# Patient Record
Sex: MALE | Race: WHITE | Employment: UNEMPLOYED | URBAN - METROPOLITAN AREA
[De-identification: names, ages, dates, MRNs, and addresses within clinical notes are randomized per-mention and may not be internally consistent; named-entity substitution may affect disease eponyms.]

---

## 2020-09-15 ENCOUNTER — HOSPITAL ENCOUNTER (EMERGENCY)
Facility: CLINIC | Age: 40
Discharge: HOME | End: 2020-09-16
Attending: EMERGENCY MEDICINE | Admitting: EMERGENCY MEDICINE
Payer: COMMERCIAL

## 2020-09-15 DIAGNOSIS — R00.0 TACHYCARDIA: ICD-10-CM

## 2020-09-15 DIAGNOSIS — F10.930 ALCOHOL WITHDRAWAL SYNDROME WITHOUT COMPLICATION (H): ICD-10-CM

## 2020-09-15 DIAGNOSIS — R11.2 NAUSEA AND VOMITING IN ADULT: ICD-10-CM

## 2020-09-15 PROCEDURE — 83735 ASSAY OF MAGNESIUM: CPT | Performed by: EMERGENCY MEDICINE

## 2020-09-15 PROCEDURE — 93005 ELECTROCARDIOGRAM TRACING: CPT | Performed by: EMERGENCY MEDICINE

## 2020-09-15 PROCEDURE — 85025 COMPLETE CBC W/AUTO DIFF WBC: CPT | Performed by: EMERGENCY MEDICINE

## 2020-09-15 PROCEDURE — 80053 COMPREHEN METABOLIC PANEL: CPT | Performed by: EMERGENCY MEDICINE

## 2020-09-15 PROCEDURE — 84443 ASSAY THYROID STIM HORMONE: CPT | Performed by: EMERGENCY MEDICINE

## 2020-09-15 PROCEDURE — 84484 ASSAY OF TROPONIN QUANT: CPT | Performed by: EMERGENCY MEDICINE

## 2020-09-15 PROCEDURE — 99284 EMERGENCY DEPT VISIT MOD MDM: CPT | Performed by: EMERGENCY MEDICINE

## 2020-09-15 PROCEDURE — 93010 ELECTROCARDIOGRAM REPORT: CPT | Mod: Z6 | Performed by: EMERGENCY MEDICINE

## 2020-09-15 PROCEDURE — 85379 FIBRIN DEGRADATION QUANT: CPT | Performed by: EMERGENCY MEDICINE

## 2020-09-15 PROCEDURE — 99285 EMERGENCY DEPT VISIT HI MDM: CPT | Mod: 25 | Performed by: EMERGENCY MEDICINE

## 2020-09-15 RX ORDER — SODIUM CHLORIDE 9 MG/ML
INJECTION, SOLUTION INTRAVENOUS CONTINUOUS
Status: DISCONTINUED | OUTPATIENT
Start: 2020-09-16 | End: 2020-09-16 | Stop reason: HOSPADM

## 2020-09-15 RX ORDER — LANOLIN ALCOHOL/MO/W.PET/CERES
100 CREAM (GRAM) TOPICAL DAILY
Status: DISCONTINUED | OUTPATIENT
Start: 2020-09-16 | End: 2020-09-16 | Stop reason: HOSPADM

## 2020-09-15 RX ORDER — FOLIC ACID 1 MG/1
1 TABLET ORAL DAILY
Status: DISCONTINUED | OUTPATIENT
Start: 2020-09-16 | End: 2020-09-16 | Stop reason: HOSPADM

## 2020-09-15 RX ORDER — DIAZEPAM 5 MG
10 TABLET ORAL EVERY 30 MIN PRN
Status: DISCONTINUED | OUTPATIENT
Start: 2020-09-15 | End: 2020-09-16 | Stop reason: HOSPADM

## 2020-09-15 RX ORDER — MULTIPLE VITAMINS W/ MINERALS TAB 9MG-400MCG
1 TAB ORAL DAILY
Status: DISCONTINUED | OUTPATIENT
Start: 2020-09-16 | End: 2020-09-16 | Stop reason: HOSPADM

## 2020-09-15 ASSESSMENT — MIFFLIN-ST. JEOR: SCORE: 1694.17

## 2020-09-15 NOTE — ED AVS SNAPSHOT
[facility], [place], Emergency Department  [address] [place] [id number]  Phone:  [phone]                                    José Luis Melvin   MRN: [id number]    Department:  [facility], Emergency Department   Date of Visit:  9/15/2020           After Visit Summary Signature Page    I have received my discharge instructions, and my questions have been answered. I have discussed any challenges I see with this plan with the nurse or doctor.    ..........................................................................................................................................  Patient/Patient Representative Signature      ..........................................................................................................................................  Patient Representative Print Name and Relationship to Patient    ..................................................               ................................................  Date                                   Time    ..........................................................................................................................................  Reviewed by Signature/Title    ...................................................              ..............................................  Date                                               Time          22EPIC Rev 08/18

## 2020-09-16 VITALS
HEART RATE: 75 BPM | TEMPERATURE: 97.8 F | WEIGHT: 168 LBS | DIASTOLIC BLOOD PRESSURE: 84 MMHG | HEIGHT: 71 IN | RESPIRATION RATE: 16 BRPM | BODY MASS INDEX: 23.52 KG/M2 | SYSTOLIC BLOOD PRESSURE: 129 MMHG | OXYGEN SATURATION: 98 %

## 2020-09-16 LAB
ALBUMIN SERPL-MCNC: 4.8 G/DL (ref 3.4–5)
ALP SERPL-CCNC: 56 U/L (ref 40–150)
ALT SERPL W P-5'-P-CCNC: 29 U/L (ref 0–70)
AMPHETAMINES UR QL SCN: NEGATIVE
ANION GAP SERPL CALCULATED.3IONS-SCNC: 13 MMOL/L (ref 3–14)
AST SERPL W P-5'-P-CCNC: 28 U/L (ref 0–45)
BARBITURATES UR QL: NEGATIVE
BASOPHILS # BLD AUTO: 0 10E9/L (ref 0–0.2)
BASOPHILS NFR BLD AUTO: 0.4 %
BENZODIAZ UR QL: NEGATIVE
BILIRUB SERPL-MCNC: 0.9 MG/DL (ref 0.2–1.3)
BUN SERPL-MCNC: 4 MG/DL (ref 7–30)
CALCIUM SERPL-MCNC: 9.6 MG/DL (ref 8.5–10.1)
CANNABINOIDS UR QL SCN: NEGATIVE
CHLORIDE SERPL-SCNC: 100 MMOL/L (ref 94–109)
CO2 SERPL-SCNC: 22 MMOL/L (ref 20–32)
COCAINE UR QL: NEGATIVE
CREAT SERPL-MCNC: 0.82 MG/DL (ref 0.66–1.25)
D DIMER PPP FEU-MCNC: 0.5 UG/ML FEU (ref 0–0.5)
DIFFERENTIAL METHOD BLD: ABNORMAL
EOSINOPHIL # BLD AUTO: 0 10E9/L (ref 0–0.7)
EOSINOPHIL NFR BLD AUTO: 0 %
ERYTHROCYTE [DISTWIDTH] IN BLOOD BY AUTOMATED COUNT: 14.3 % (ref 10–15)
ETHANOL SERPL-MCNC: <0.01 G/DL
ETHANOL UR QL SCN: NEGATIVE
GFR SERPL CREATININE-BSD FRML MDRD: >90 ML/MIN/{1.73_M2}
GLUCOSE SERPL-MCNC: 133 MG/DL (ref 70–99)
HCT VFR BLD AUTO: 38.1 % (ref 40–53)
HGB BLD-MCNC: 13.1 G/DL (ref 13.3–17.7)
IMM GRANULOCYTES # BLD: 0 10E9/L (ref 0–0.4)
IMM GRANULOCYTES NFR BLD: 0.3 %
LACTATE BLD-SCNC: 0.9 MMOL/L (ref 0.7–2)
LYMPHOCYTES # BLD AUTO: 1.4 10E9/L (ref 0.8–5.3)
LYMPHOCYTES NFR BLD AUTO: 15 %
MAGNESIUM SERPL-MCNC: 1.9 MG/DL (ref 1.6–2.3)
MCH RBC QN AUTO: 32.2 PG (ref 26.5–33)
MCHC RBC AUTO-ENTMCNC: 34.4 G/DL (ref 31.5–36.5)
MCV RBC AUTO: 94 FL (ref 78–100)
MONOCYTES # BLD AUTO: 0.7 10E9/L (ref 0–1.3)
MONOCYTES NFR BLD AUTO: 7.3 %
NEUTROPHILS # BLD AUTO: 7 10E9/L (ref 1.6–8.3)
NEUTROPHILS NFR BLD AUTO: 77 %
NRBC # BLD AUTO: 0 10*3/UL
NRBC BLD AUTO-RTO: 0 /100
OPIATES UR QL SCN: NEGATIVE
PLATELET # BLD AUTO: 332 10E9/L (ref 150–450)
POTASSIUM SERPL-SCNC: 3.3 MMOL/L (ref 3.4–5.3)
PROT SERPL-MCNC: 8.6 G/DL (ref 6.8–8.8)
RBC # BLD AUTO: 4.07 10E12/L (ref 4.4–5.9)
SODIUM SERPL-SCNC: 135 MMOL/L (ref 133–144)
TROPONIN I SERPL-MCNC: <0.015 UG/L (ref 0–0.04)
TSH SERPL DL<=0.005 MIU/L-ACNC: 2.62 MU/L (ref 0.4–4)
WBC # BLD AUTO: 9.1 10E9/L (ref 4–11)

## 2020-09-16 PROCEDURE — 25800030 ZZH RX IP 258 OP 636: Performed by: EMERGENCY MEDICINE

## 2020-09-16 PROCEDURE — 80320 DRUG SCREEN QUANTALCOHOLS: CPT | Performed by: EMERGENCY MEDICINE

## 2020-09-16 PROCEDURE — 83605 ASSAY OF LACTIC ACID: CPT | Performed by: EMERGENCY MEDICINE

## 2020-09-16 PROCEDURE — 80307 DRUG TEST PRSMV CHEM ANLYZR: CPT | Performed by: EMERGENCY MEDICINE

## 2020-09-16 PROCEDURE — 96374 THER/PROPH/DIAG INJ IV PUSH: CPT | Performed by: EMERGENCY MEDICINE

## 2020-09-16 PROCEDURE — 80320 DRUG SCREEN QUANTALCOHOLS: CPT | Mod: 59 | Performed by: EMERGENCY MEDICINE

## 2020-09-16 PROCEDURE — 96361 HYDRATE IV INFUSION ADD-ON: CPT | Performed by: EMERGENCY MEDICINE

## 2020-09-16 PROCEDURE — 25000128 H RX IP 250 OP 636: Performed by: EMERGENCY MEDICINE

## 2020-09-16 PROCEDURE — 25000132 ZZH RX MED GY IP 250 OP 250 PS 637: Performed by: EMERGENCY MEDICINE

## 2020-09-16 RX ORDER — ONDANSETRON 2 MG/ML
4 INJECTION INTRAMUSCULAR; INTRAVENOUS EVERY 30 MIN PRN
Status: DISCONTINUED | OUTPATIENT
Start: 2020-09-16 | End: 2020-09-16 | Stop reason: HOSPADM

## 2020-09-16 RX ORDER — ONDANSETRON 4 MG/1
4 TABLET, ORALLY DISINTEGRATING ORAL EVERY 8 HOURS PRN
Qty: 9 TABLET | Refills: 0 | Status: SHIPPED | OUTPATIENT
Start: 2020-09-16 | End: 2020-09-19

## 2020-09-16 RX ORDER — ONDANSETRON 2 MG/ML
4 INJECTION INTRAMUSCULAR; INTRAVENOUS ONCE
Status: COMPLETED | OUTPATIENT
Start: 2020-09-16 | End: 2020-09-16

## 2020-09-16 RX ADMIN — ONDANSETRON 4 MG: 2 INJECTION INTRAMUSCULAR; INTRAVENOUS at 00:33

## 2020-09-16 RX ADMIN — DIAZEPAM 10 MG: 5 TABLET ORAL at 00:38

## 2020-09-16 RX ADMIN — ONDANSETRON 4 MG: 2 INJECTION INTRAMUSCULAR; INTRAVENOUS at 00:06

## 2020-09-16 RX ADMIN — SODIUM CHLORIDE: 9 INJECTION, SOLUTION INTRAVENOUS at 01:28

## 2020-09-16 RX ADMIN — SODIUM CHLORIDE 1000 ML: 9 INJECTION, SOLUTION INTRAVENOUS at 00:27

## 2020-09-16 RX ADMIN — DIAZEPAM 10 MG: 5 TABLET ORAL at 02:52

## 2020-09-16 ASSESSMENT — ENCOUNTER SYMPTOMS
RHINORRHEA: 0
VOMITING: 1
CONFUSION: 0
BACK PAIN: 0
DIAPHORESIS: 1
TREMORS: 1
SHORTNESS OF BREATH: 1
PALPITATIONS: 1
DYSURIA: 0
NAUSEA: 1
COUGH: 0
CONSTIPATION: 0
DIZZINESS: 1
HEMATURIA: 0
BRUISES/BLEEDS EASILY: 0
ABDOMINAL PAIN: 0
FEVER: 0
DIARRHEA: 0
CHILLS: 1

## 2020-09-16 NOTE — ED PROVIDER NOTES
[place] EMERGENCY DEPARTMENT ([facility])  September 15, 2020  History     Chief Complaint   Patient presents with     Shortness of Breath     Nausea & Vomiting     HPI  José Luis Melvin is a (no history found) 39 year old male who presents the ED today complaining of shortness of breath, nausea, and vomiting.  Patient reports he was driving here from [place] to move his daughter into the dorms when he started getting dizzy.  Patient reports he pulled over and vomited.  Patient reports he drank 2 five hour energies and 2 coffees today, and he did not eat much.  Patient report he normally drinks about a cup of coffee a day and usually eats normally.  Patient reports they stopped at a hotel, where he tried to rehydrate but could not keep down any fluids.  Patient reports he then began experiencing heart palpitations.  Patient reports his nausea and vomiting started at 8 PM tonight.  Patient also reports he felt like he could not breathe.  Patient reports he still feels slightly short of breath here in the ED. Patient reports he has had panic attacks in the past and it felt like that but more severe.  Patient also adds that he goes between feeling cold and hot. Patient reports he started shaking after throwing up in the hotel.  Patient reports he normally drinks 6-10 beers a day with his last drink being this morning of 4 beers. Patient reports he recently had a procedure on his right ankle, in which he stopped drinking a week before the surgery, and for a week after.  Patient reports he experiences no alcohol withdrawal symptoms during this time.  Patient reports he sometimes uses cocaine, but none in the past 48 hours.  Patient reports he sometimes eats 5 mg marijuana edibles, but not today.  Patient denies prior DTs or withdrawal seizures.  Patient denies any abdominal pain, diarrhea, constipation, chest pain, recent cough or cold, urinary symptoms, history of heart issues, or any tobacco  "use.    I have reviewed the Medications, Allergies, Past Medical and Surgical History, and Social History in the [employer] system.  PAST MEDICAL HISTORY:   Past Medical History:   Diagnosis Date     Hypertension        PAST SURGICAL HISTORY:   Past Surgical History:   Procedure Laterality Date     ORTHOPEDIC SURGERY      ACL       Past medical history, past surgical history, medications, and allergies were reviewed with the patient. Additional pertinent items: None    FAMILY HISTORY: No family history on file.    SOCIAL HISTORY:   Social History     Tobacco Use     Smoking status: Passive Smoke Exposure - Never Smoker   Substance Use Topics     Alcohol use: Yes     Alcohol/week: 4.0 standard drinks     Types: 4 Cans of beer per week     Comment: daily 6-8 beers     Social history was reviewed with the patient. Additional pertinent items: None      Patient's Medications    No medications on file        No Known Allergies     Review of Systems   Constitutional: Positive for chills and diaphoresis. Negative for fever.   HENT: Negative for rhinorrhea.    Eyes: Negative for visual disturbance.   Respiratory: Positive for shortness of breath. Negative for cough.    Cardiovascular: Positive for palpitations. Negative for chest pain.   Gastrointestinal: Positive for nausea and vomiting. Negative for abdominal pain, constipation and diarrhea.   Genitourinary: Negative for dysuria and hematuria.   Musculoskeletal: Negative for back pain.   Skin: Negative for rash.   Allergic/Immunologic: Negative for immunocompromised state.   Neurological: Positive for dizziness and tremors (Generalized).   Hematological: Does not bruise/bleed easily.   Psychiatric/Behavioral: Negative for confusion.   All other systems reviewed and are negative.    Physical Exam   BP: (!) 129/104  Pulse: 128  Temp: 97.8  F (36.6  C)  Resp: 28  Height: 180.3 cm (5' 11\")  Weight: 76.2 kg (168 lb)  SpO2: 99 %  Physical Exam  General: Afebrile, ill appearing, " tachycardic, tremulous, nausea, and vomiting  HEENT: Normocephalic, atraumatic, conjunctivae normal. MMM  Neck: non-tender, supple  Cardio: tachycardic rate  Resp: Normal work of breathing, no respiratory distress, lungs clear bilaterally   Chest/Back: no visual signs of trauma  Abdomen: soft, no tenderness, no distension, no peritoneal signs   Neuro: alert and fully oriented. CN II-XII grossly intact. Grossly normal strength and sensation in all extremities. No focal deficits, tremulousness in extremities   MSK: no deformities. Normal range of motion  Integumentary/Skin: no rash visualized, normal color  Psych: normal affect, normal behavior    ED Course   12:08 AM  The patient was seen and examined by Hillary Warren MD in Room ED14.      Procedures          EKG Interpretation:      Interpreted by Hillary Warren MD  Time reviewed: 2348  Symptoms at time of EKG: tachycardic, shortness of breath   Rhythm: sinus tachycardia  Rate: Tachycardia  Axis: Normal  Ectopy: none  Conduction: normal  ST Segments/ T Waves: Non-specific ST-T wave changes, no acute ischemic change  Q Waves: none  Comparison to prior: No old EKG available    Clinical Impression: sinus tachycardia     CRITICAL CARE: 30 minutes exclusive of procedures but including obtaining history, bedside examination, supervision of care, record review and collateral history from other parties, documentation, review/interpretation of imaging and lab results, discussion with patient, family, nursing, ancillary staff, consultants, and admitting service provider.   This patient presented with tachycardic, in alcohol withdrawal requiring immediate bedside evaluation and intervention to prevent sudden, clinically significant, or life threatening deterioration in the patient's condition.    Results for orders placed or performed during the hospital encounter of 09/15/20 (from the past 24 hour(s))   CBC with platelets differential   Result Value Ref Range    WBC  9.1 4.0 - 11.0 10e9/L    RBC Count 4.07 (L) 4.4 - 5.9 10e12/L    Hemoglobin 13.1 (L) 13.3 - 17.7 g/dL    Hematocrit 38.1 (L) 40.0 - 53.0 %    MCV 94 78 - 100 fl    MCH 32.2 26.5 - 33.0 pg    MCHC 34.4 31.5 - 36.5 g/dL    RDW 14.3 10.0 - 15.0 %    Platelet Count 332 150 - 450 10e9/L    Diff Method Automated Method     % Neutrophils 77.0 %    % Lymphocytes 15.0 %    % Monocytes 7.3 %    % Eosinophils 0.0 %    % Basophils 0.4 %    % Immature Granulocytes 0.3 %    Nucleated RBCs 0 0 /100    Absolute Neutrophil 7.0 1.6 - 8.3 10e9/L    Absolute Lymphocytes 1.4 0.8 - 5.3 10e9/L    Absolute Monocytes 0.7 0.0 - 1.3 10e9/L    Absolute Eosinophils 0.0 0.0 - 0.7 10e9/L    Absolute Basophils 0.0 0.0 - 0.2 10e9/L    Abs Immature Granulocytes 0.0 0 - 0.4 10e9/L    Absolute Nucleated RBC 0.0    Comprehensive metabolic panel   Result Value Ref Range    Sodium 135 133 - 144 mmol/L    Potassium 3.3 (L) 3.4 - 5.3 mmol/L    Chloride 100 94 - 109 mmol/L    Carbon Dioxide 22 20 - 32 mmol/L    Anion Gap 13 3 - 14 mmol/L    Glucose 133 (H) 70 - 99 mg/dL    Urea Nitrogen 4 (L) 7 - 30 mg/dL    Creatinine 0.82 0.66 - 1.25 mg/dL    GFR Estimate >90 >60 mL/min/[1.73_m2]    GFR Estimate If Black >90 >60 mL/min/[1.73_m2]    Calcium 9.6 8.5 - 10.1 mg/dL    Bilirubin Total 0.9 0.2 - 1.3 mg/dL    Albumin 4.8 3.4 - 5.0 g/dL    Protein Total 8.6 6.8 - 8.8 g/dL    Alkaline Phosphatase 56 40 - 150 U/L    ALT 29 0 - 70 U/L    AST 28 0 - 45 U/L   Troponin I   Result Value Ref Range    Troponin I ES <0.015 0.000 - 0.045 ug/L   TSH with free T4 reflex   Result Value Ref Range    TSH 2.62 0.40 - 4.00 mU/L   Magnesium   Result Value Ref Range    Magnesium 1.9 1.6 - 2.3 mg/dL   D dimer quantitative   Result Value Ref Range    D Dimer 0.5 0.0 - 0.50 ug/ml FEU     Medications   0.9% sodium chloride BOLUS (0 mLs Intravenous Stopped 9/16/20 0127)     Followed by   sodium chloride 0.9% infusion ( Intravenous New Bag 9/16/20 0128)   diazepam (VALIUM) tablet 10 mg (10  mg Oral Given 9/16/20 0038)   thiamine (B-1) tablet 100 mg (has no administration in time range)   folic acid (FOLVITE) tablet 1 mg (has no administration in time range)   multivitamin w/minerals (THERA-VIT-M) tablet 1 tablet (has no administration in time range)   ondansetron (ZOFRAN) injection 4 mg (4 mg Intravenous Given 9/16/20 0033)   ondansetron (ZOFRAN) injection 4 mg (4 mg Intravenous Given 9/16/20 0006)          Assessments & Plan (with Medical Decision Making)   José Luis Melvin is a (no history found) 39 year old male who presents the ED today complaining of shortness of breath, nausea, and vomiting.  Upon arrival patient is ill but nontoxic-appearing, afebrile, moderate distress secondary to tremulousness, nausea, vomiting.  No focal neurological deficit.    Differential diagnosis includes includes but is not limited to arrhythmia versus infection versus metabolic/electrolyte versus dehydration versus alcohol withdrawal versus substance abuse among others.  Patient placed on CIWA scale, was given Zofran, Valium, and hydrated with 1 L normal saline IV fluid bolus.    I reviewed EKG which demonstrates sinus tachycardia with a ventricular rate of 130 bpm no acute ischemic change.  I reviewed comprehensive labs which are remarkable for white blood cell count of 9.1, hemoglobin 13.1, potassium mildly low at 3.3 but no other acute metabolic electrolyte abnormality, negative troponin, negative d-dimer, TSH within normal limits.      On reevaluation patient with improvement of his tachycardia, tremulousness, nausea, vomiting after IV hydration and oral Valium. Repeat CIWA still elevated so another oral valium given.  Patient continues to rest comfortably in the emergency department, tachycardia, tremors improved, no further emesis, no further Valium needed.  At this time patient is feeling better, patient does not want detox at this time as he is going to help his daughter move into college.  Patient  feels comfortable with discharge home.  I think it is reasonable for him to be discharged, encouraged him to orally hydrate with water, Gatorade, refrain from alcohol, and seek detox/treatment upon return to [place]. Encouraged close follow up with his primary care provider.  Patient understands and agrees with the plan.  Return precautions discussed if any chest pain, difficulty breathing, tremulousness, persistent vomiting, any worsening symptoms.  Patient understands and agrees the plan.    I have reviewed the nursing notes.    I have reviewed the findings, diagnosis, plan and need for follow up with the patient.    New Prescriptions    No medications on file       Final diagnoses:   Nausea and vomiting in adult   Tachycardia   Alcohol withdrawal syndrome without complication (H)   I, Nando Mejia, am serving as a trained medical scribe to document services personally performed by Hillary Warren MD, based on the provider's statements to me.     IHillary MD, was physically present and have reviewed and verified the accuracy of this note documented by Nando Mejia.      9/15/2020   [facility], [place], EMERGENCY DEPARTMENT     Hillary Warren MD  09/17/20 0405

## 2020-09-16 NOTE — ED TRIAGE NOTES
Pt arrived to the ER With c/o SOA and nausea/vomiting for the past 2 hours. Pt states that he was driving from [place] to here. Denies chest pain and or any diarrhea. Denies any cardiac hx. Pt states that he had recent ACL surgery a month ago. Daughter states that pt has had 2-5 energy drink and some coffee. Afebrile with STachy.

## 2020-09-16 NOTE — ED NOTES
Pt woke up when I entered the room to round on him.  He states that he is feeling better.  Nausea much improved.  He is going to call his daughter to come back to pick him up.

## 2020-09-16 NOTE — DISCHARGE INSTRUCTIONS
Thank you for your patience today.  Please follow-up with your regular doctor in the next 2-3 days for further evaluation and follow-up care.  Please call to schedule an appointment.  Please continue your own medications.  Please drink plenty of liquids (water, Gatorade). Please try to avoid any caffeine. Please return to the ER if you develop any worsening of your current symptoms.  It was a pleasure taking care of you today.  We hope you feel better soon.

## 2021-01-04 ENCOUNTER — HEALTH MAINTENANCE LETTER (OUTPATIENT)
Age: 41
End: 2021-01-04

## 2021-10-11 ENCOUNTER — HEALTH MAINTENANCE LETTER (OUTPATIENT)
Age: 41
End: 2021-10-11

## 2022-01-30 ENCOUNTER — HEALTH MAINTENANCE LETTER (OUTPATIENT)
Age: 42
End: 2022-01-30

## 2022-09-24 ENCOUNTER — HEALTH MAINTENANCE LETTER (OUTPATIENT)
Age: 42
End: 2022-09-24

## 2023-05-08 ENCOUNTER — HEALTH MAINTENANCE LETTER (OUTPATIENT)
Age: 43
End: 2023-05-08